# Patient Record
Sex: FEMALE | URBAN - METROPOLITAN AREA
[De-identification: names, ages, dates, MRNs, and addresses within clinical notes are randomized per-mention and may not be internally consistent; named-entity substitution may affect disease eponyms.]

---

## 2021-04-06 ENCOUNTER — TRANSFERRED RECORDS (OUTPATIENT)
Dept: HEALTH INFORMATION MANAGEMENT | Facility: CLINIC | Age: 41
End: 2021-04-06

## 2021-04-09 NOTE — TELEPHONE ENCOUNTER
RECORDS RECEIVED FROM: prev. hematoma removed in 2006 // (R) Knee abnormal growth , per pt ref. by Marcel Whitley @ [facility] in [place], Xray & MRI taken @ [facility]    DATE RECEIVED: Apr 16, 2021     NOTES STATUS DETAILS   OFFICE NOTE from referring provider CARE [facility]WHERE Owen Marcus MD   OFFICE NOTE from other specialist N/A    DISCHARGE SUMMARY from hospital N/A    DISCHARGE REPORT from the ER N/A    OPERATIVE REPORT N/A    MEDICATION LIST Internal    IMPLANT RECORD/STICKER N/A    LABS     CBC/DIFF N/A    CULTURES N/A    INJECTIONS DONE IN RADIOLOGY N/A    MRI In process    CT SCAN N/A    XRAYS (IMAGES & REPORTS) N/A    TUMOR     PATHOLOGY  Slides & report N/A    04/12/21   11:53 AM   IMAGES IN PACS  COMPLETE  Audra Santana CMA    04/09/21   11:20 AM   CALLED  FOR MRI  Audra Santana CMA

## 2021-04-16 ENCOUNTER — PRE VISIT (OUTPATIENT)
Dept: ORTHOPEDICS | Facility: CLINIC | Age: 41
End: 2021-04-16

## 2021-04-16 ENCOUNTER — OFFICE VISIT (OUTPATIENT)
Dept: ORTHOPEDICS | Facility: CLINIC | Age: 41
End: 2021-04-16
Payer: COMMERCIAL

## 2021-04-16 VITALS — BODY MASS INDEX: 19.66 KG/M2 | HEIGHT: 65 IN | WEIGHT: 118 LBS

## 2021-04-16 DIAGNOSIS — D18.00 INTRAMUSCULAR HEMANGIOMA: Primary | ICD-10-CM

## 2021-04-16 PROCEDURE — 99203 OFFICE O/P NEW LOW 30 MIN: CPT | Performed by: ORTHOPAEDIC SURGERY

## 2021-04-16 ASSESSMENT — MIFFLIN-ST. JEOR: SCORE: 1195.5

## 2021-04-16 NOTE — NURSING NOTE
"Reason For Visit:   Chief Complaint   Patient presents with     Consult     consulting right femur lesion // had right distal thigh mass removed in [place] in 2006       Ht 1.65 m (5' 4.96\")   Wt 53.5 kg (118 lb)   BMI 19.66 kg/m      Pain Assessment  Patient Currently in Pain: No(no pain today per pt // can get to 4 with physical activities)    Chloe Pedraza ATC    "

## 2021-04-16 NOTE — LETTER
4/16/2021         RE: Urszula Neville  [address]  [place] [address]        Dear Colleague,    Thank you for referring your patient, Urszula Neville, to the [facility]. Please see a copy of my visit note below.    This patient had a hemangioma resected from the lateral aspect of her right distal thigh in [place] about 15 years ago.  She still has some pains that radiate down from the hip down into the lower leg after she is walked 6 or 7 miles.  Today she denies any pain.  She used to have a lot of swelling in the leg before the surgery but does not have this any longer.    On examination she is alert oriented has normal mood and affect and is in no acute distress peer respirations are regular and unlabored eyes are nonicteric.  In the right lower extremity there is no edema or erythema.  She has a well-healed scar from the lateral aspect of the patellofemoral joint up into the quadricep muscle.  I cannot appreciate any masses there and she is nontender throughout that anterior lateral thigh.  There is no distal edema or erythema in the right lower extremity.  She also has decreased sensation posterior to the distal aspect of the incision.  Her knee range of motion is within normal limits.  She has a normal gait.    Her MRI shows a residual hemangioma in the vastus lateralis and fatty atrophy distal to that.  It seems that she had surgery to resect the hemangioma and there may be some residual or recurrent tumor here.    Given that she is nontender and its not clear that her symptoms are tumor related I would like her to continue to monitor the symptoms.  I would repeat an MRI in 1year unless her symptoms increased and then I would want to do that immediately.  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  The patient is quite happy with this plan.  She understands that the risk of  this being malignant is quite small.  She will return in 12 months with a repeat MRI.      Again, thank you for allowing me to participate in the care of your patient.        Sincerely,        Edward Coleman MD

## 2021-04-16 NOTE — PROGRESS NOTES
This patient had a hemangioma resected from the lateral aspect of her right distal thigh in [place] about 15 years ago.  She still has some pains that radiate down from the hip down into the lower leg after she is walked 6 or 7 miles.  Today she denies any pain.  She used to have a lot of swelling in the leg before the surgery but does not have this any longer.    On examination she is alert oriented has normal mood and affect and is in no acute distress peer respirations are regular and unlabored eyes are nonicteric.  In the right lower extremity there is no edema or erythema.  She has a well-healed scar from the lateral aspect of the patellofemoral joint up into the quadricep muscle.  I cannot appreciate any masses there and she is nontender throughout that anterior lateral thigh.  There is no distal edema or erythema in the right lower extremity.  She also has decreased sensation posterior to the distal aspect of the incision.  Her knee range of motion is within normal limits.  She has a normal gait.    Her MRI shows a residual hemangioma in the vastus lateralis and fatty atrophy distal to that.  It seems that she had surgery to resect the hemangioma and there may be some residual or recurrent tumor here.    Given that she is nontender and its not clear that her symptoms are tumor related I would like her to continue to monitor the symptoms.  I would repeat an MRI in 1year unless her symptoms increased and then I would want to do that immediately.  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  The patient is quite happy with this plan.  She understands that the risk of this being malignant is quite small.  She will return in 12 months with a repeat MRI.

## 2021-04-16 NOTE — LETTER
Date:April 19, 2021      Patient was self referred, no letter generated. Do not send.        [facility] Information